# Patient Record
Sex: MALE | Race: OTHER | HISPANIC OR LATINO | URBAN - METROPOLITAN AREA
[De-identification: names, ages, dates, MRNs, and addresses within clinical notes are randomized per-mention and may not be internally consistent; named-entity substitution may affect disease eponyms.]

---

## 2024-03-15 ENCOUNTER — EMERGENCY (EMERGENCY)
Facility: HOSPITAL | Age: 29
LOS: 1 days | Discharge: HOME | End: 2024-03-15
Admitting: STUDENT IN AN ORGANIZED HEALTH CARE EDUCATION/TRAINING PROGRAM
Payer: COMMERCIAL

## 2024-03-15 VITALS
OXYGEN SATURATION: 96 % | TEMPERATURE: 98 F | WEIGHT: 190.04 LBS | HEIGHT: 71 IN | RESPIRATION RATE: 17 BRPM | DIASTOLIC BLOOD PRESSURE: 73 MMHG | SYSTOLIC BLOOD PRESSURE: 133 MMHG | HEART RATE: 70 BPM
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
## 2024-03-15 DIAGNOSIS — S61.214A LACERATION WITHOUT FOREIGN BODY OF RIGHT RING FINGER WITHOUT DAMAGE TO NAIL, INITIAL ENCOUNTER: ICD-10-CM
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
## 2024-03-15 PROCEDURE — 99283 EMERGENCY DEPT VISIT LOW MDM: CPT | Mod: 25

## 2024-03-15 PROCEDURE — 73140 X-RAY EXAM OF FINGER(S): CPT | Mod: 26,RT

## 2024-03-15 PROCEDURE — 12001 RPR S/N/AX/GEN/TRNK 2.5CM/<: CPT

## 2024-03-15 PROCEDURE — 73140 X-RAY EXAM OF FINGER(S): CPT

## 2024-03-15 PROCEDURE — 99284 EMERGENCY DEPT VISIT MOD MDM: CPT | Mod: 25

## 2024-03-15 NOTE — ED PROVIDER NOTE - PATIENT PORTAL LINK FT
You can access the FollowMyHealth Patient Portal offered by [facility] by registering at the following website: [email or web address]. By joining [email or web address] FollowMyHealth portal, you will also be able to view your health information using other applications (apps) compatible with our system.

## 2024-03-15 NOTE — ED ADULT TRIAGE NOTE - CHIEF COMPLAINT QUOTE
pt to ED c/o 1cm lac to right 4th finger with glass just prior to arrival. unknown last tetanus. bleeding controlled in triage with gauze. A&Ox4. ambulatory with steady gait

## 2024-03-15 NOTE — ED PROVIDER NOTE - PHYSICAL EXAMINATION
No other finger injury. xray shows no FB on 4th finger. 5th finger intact , not clinically correlate.

## 2024-03-15 NOTE — ED ADULT NURSE NOTE - OBJECTIVE STATEMENT
27 yo M, denies PMHx, presents to the ED co laceration to R 4th finger. Pt awake and alert, AOx4. Pt reports he was working and a rack of glasses fell and his R 4th finger got sliced. pt presents with laceration to R 4th finger that is no longer actively bleeding. pt denies pain. pt denies numbness / tingling. Pt cap refill <2 seconds. Pt denies all other medical co. Pt reports his last Tetanus shot was 8 years ago.

## 2024-03-15 NOTE — ED ADULT NURSE NOTE - NSFALLUNIVINTERV_ED_ALL_ED
Bed/Stretcher in lowest position, wheels locked, appropriate side rails in place/Call bell, personal items and telephone in reach/Instruct patient to call for assistance before getting out of bed/chair/stretcher/Non-slip footwear applied when patient is off stretcher/[place] to call system/Physically safe environment - no spills, clutter or unnecessary equipment/Purposeful proactive rounding/Room/bathroom lighting operational, light cord in reach

## 2024-03-15 NOTE — ED PROVIDER NOTE - CLINICAL SUMMARY MEDICAL DECISION MAKING FREE TEXT BOX
27y/o m with finger laceration. Uncomplicated superficial laceration was repaired. Xray negative for FB. Td was already up to date. Suture removal in 7 10 day.

## 2024-03-15 NOTE — ED PROVIDER NOTE - OBJECTIVE STATEMENT
27 y/o m with no pmh present to ED c/o right hand 4th finger laceration while working today. Patient report of carrying a heavy rack of glasses a upon trying to placed them they fell. Admit of active bleeding. No numbness or tingling. Td up to date.

## 2024-03-15 NOTE — ED PROVIDER NOTE - NSFOLLOWUPINSTRUCTIONS_ED_ALL_ED_FT
Recommend get area dry and clean for 24 hrs. Daily wound care and use bacitracin for three days small amount. Allow air dry when home. Keep covered when outdoors. Suture removal in 10 days. Avoid bending  finger as much as possible.